# Patient Record
Sex: FEMALE | Race: OTHER | NOT HISPANIC OR LATINO | URBAN - METROPOLITAN AREA
[De-identification: names, ages, dates, MRNs, and addresses within clinical notes are randomized per-mention and may not be internally consistent; named-entity substitution may affect disease eponyms.]

---

## 2023-04-15 ENCOUNTER — EMERGENCY (EMERGENCY)
Facility: HOSPITAL | Age: 63
LOS: 0 days | Discharge: HOME | End: 2023-04-15
Attending: STUDENT IN AN ORGANIZED HEALTH CARE EDUCATION/TRAINING PROGRAM
Payer: COMMERCIAL

## 2023-04-15 VITALS
DIASTOLIC BLOOD PRESSURE: 95 MMHG | WEIGHT: 227.08 LBS | OXYGEN SATURATION: 98 % | HEIGHT: 64 IN | TEMPERATURE: 98 F | HEART RATE: 100 BPM | SYSTOLIC BLOOD PRESSURE: 157 MMHG | RESPIRATION RATE: 18 BRPM

## 2023-04-15 VITALS
HEART RATE: 85 BPM | DIASTOLIC BLOOD PRESSURE: 93 MMHG | RESPIRATION RATE: 16 BRPM | TEMPERATURE: 98 F | OXYGEN SATURATION: 98 % | SYSTOLIC BLOOD PRESSURE: 198 MMHG

## 2023-04-15 DIAGNOSIS — N64.52 NIPPLE DISCHARGE: ICD-10-CM

## 2023-04-15 PROCEDURE — 99284 EMERGENCY DEPT VISIT MOD MDM: CPT

## 2023-04-15 PROCEDURE — 71250 CT THORAX DX C-: CPT | Mod: 26,MA

## 2023-04-15 NOTE — ED PROVIDER NOTE - OBJECTIVE STATEMENT
62 year old female presents today c/o small amount of dark blood noted from her right nipple yesterday, pt states that she did strike her breast against a door at work on Friday, thought nothing of it, denies having any pain and open wound, yesterday a small drop of blood was seen, pts last breast sono and mammo was 2 years ago, at that time her results were normal

## 2023-04-15 NOTE — ED PROVIDER NOTE - PATIENT PORTAL LINK FT
You can access the FollowMyHealth Patient Portal offered by [facility] by registering at the following website: [email or web address]. By joining [employer]’s FollowMyHealth portal, you will also be able to view your health information using other applications (apps) compatible with our system.

## 2023-04-15 NOTE — ED ADULT NURSE REASSESSMENT NOTE - NS ED NURSE REASSESS COMMENT FT1
Pt AOx4 and acepts the d/c from the MD. Pt reports no acute distress at this time. Pt states shes going to take her BP meds at home.
Pt recived from Day RN Erika. Pt rpeorts no acute distress at this time. Will continue to monitor.

## 2023-04-15 NOTE — ED PROVIDER NOTE - NSFOLLOWUPINSTRUCTIONS_ED_ALL_ED_FT
I have discussed the discharge plan with the patient. The patient agrees with the plan, as discussed.  The patient understands Emergency Department diagnosis is a preliminary diagnosis often based on limited information and that the patient must adhere to the follow-up plan as discussed.  The patient understands that if the symptoms worsen or if prescribed medications do not have the desired/planned effect that the patient may return to the Emergency Department at any time for further evaluation and treatment. Pt to follow up with her gynecologist for outpt breast sono and mammo

## 2023-04-15 NOTE — ED PROVIDER NOTE - CARE PLAN
1 Principal Discharge DX:	Nipple discharge   Principal Discharge DX:	Nipple discharge  Secondary Diagnosis:	Breast pain

## 2023-04-15 NOTE — ED PROVIDER NOTE - CLINICAL SUMMARY MEDICAL DECISION MAKING FREE TEXT BOX
pt presented today concerned for bloody discharge from her right nipple, pt states that her struck her breast against a door at work, on exam breast exam is normal, pts skin is without bruising, redness or swelling , no blooding discharge noted, follow up with her gyn in one week

## 2023-04-15 NOTE — ED ADULT NURSE NOTE - OBJECTIVE STATEMENT
as per pt, "I hit my breast at work and water was coming out of my nipple and my BP was high I also have pain to the top part of my L shoulder my cardiologist changed my BP medication today and it is bringing down my pressure" pt in no acute distress, no trauma noted to breast area , no drainage noted

## 2023-04-15 NOTE — ED ADULT TRIAGE NOTE - CHIEF COMPLAINT QUOTE
Patient arrived to ED with complaints of pain and discharge to R breast for the past couple post injury to site about a week ago.

## 2023-07-26 ENCOUNTER — OUTPATIENT (OUTPATIENT)
Dept: OUTPATIENT SERVICES | Facility: HOSPITAL | Age: 63
LOS: 1 days | End: 2023-07-26
Payer: COMMERCIAL

## 2023-07-26 ENCOUNTER — OUTPATIENT (OUTPATIENT)
Dept: OUTPATIENT SERVICES | Facility: HOSPITAL | Age: 63
LOS: 1 days | End: 2023-07-26

## 2023-07-26 VITALS
RESPIRATION RATE: 16 BRPM | DIASTOLIC BLOOD PRESSURE: 98 MMHG | OXYGEN SATURATION: 98 % | TEMPERATURE: 98 F | SYSTOLIC BLOOD PRESSURE: 160 MMHG | HEIGHT: 64 IN | WEIGHT: 246.04 LBS | HEART RATE: 84 BPM

## 2023-07-26 DIAGNOSIS — C50.911 MALIGNANT NEOPLASM OF UNSPECIFIED SITE OF RIGHT FEMALE BREAST: ICD-10-CM

## 2023-07-26 DIAGNOSIS — E78.5 HYPERLIPIDEMIA, UNSPECIFIED: ICD-10-CM

## 2023-07-26 DIAGNOSIS — I10 ESSENTIAL (PRIMARY) HYPERTENSION: ICD-10-CM

## 2023-07-26 DIAGNOSIS — Z98.890 OTHER SPECIFIED POSTPROCEDURAL STATES: Chronic | ICD-10-CM

## 2023-07-26 DIAGNOSIS — Z98.891 HISTORY OF UTERINE SCAR FROM PREVIOUS SURGERY: Chronic | ICD-10-CM

## 2023-07-26 DIAGNOSIS — E11.9 TYPE 2 DIABETES MELLITUS WITHOUT COMPLICATIONS: ICD-10-CM

## 2023-07-26 DIAGNOSIS — Z01.818 ENCOUNTER FOR OTHER PREPROCEDURAL EXAMINATION: ICD-10-CM

## 2023-07-26 LAB
A1C WITH ESTIMATED AVERAGE GLUCOSE RESULT: 6.9 % — HIGH (ref 4–5.6)
ANION GAP SERPL CALC-SCNC: 8 MMOL/L — SIGNIFICANT CHANGE UP (ref 5–17)
APPEARANCE UR: CLEAR — SIGNIFICANT CHANGE UP
APTT BLD: 28.7 SEC — SIGNIFICANT CHANGE UP (ref 24.5–35.6)
BACTERIA # UR AUTO: ABNORMAL /HPF
BILIRUB UR-MCNC: NEGATIVE — SIGNIFICANT CHANGE UP
BUN SERPL-MCNC: 12 MG/DL — SIGNIFICANT CHANGE UP (ref 7–18)
CALCIUM SERPL-MCNC: 8.9 MG/DL — SIGNIFICANT CHANGE UP (ref 8.4–10.5)
CHLORIDE SERPL-SCNC: 108 MMOL/L — SIGNIFICANT CHANGE UP (ref 96–108)
CO2 SERPL-SCNC: 27 MMOL/L — SIGNIFICANT CHANGE UP (ref 22–31)
COLOR SPEC: YELLOW — SIGNIFICANT CHANGE UP
CREAT SERPL-MCNC: 0.63 MG/DL — SIGNIFICANT CHANGE UP (ref 0.5–1.3)
DIFF PNL FLD: ABNORMAL
EGFR: 100 ML/MIN/1.73M2 — SIGNIFICANT CHANGE UP
EPI CELLS # UR: ABNORMAL /HPF
ESTIMATED AVERAGE GLUCOSE: 151 MG/DL — HIGH (ref 68–114)
GLUCOSE SERPL-MCNC: 125 MG/DL — HIGH (ref 70–99)
GLUCOSE UR QL: NEGATIVE — SIGNIFICANT CHANGE UP
HCT VFR BLD CALC: 47.2 % — HIGH (ref 34.5–45)
HGB BLD-MCNC: 15.2 G/DL — SIGNIFICANT CHANGE UP (ref 11.5–15.5)
INR BLD: 0.93 RATIO — SIGNIFICANT CHANGE UP (ref 0.85–1.18)
KETONES UR-MCNC: NEGATIVE — SIGNIFICANT CHANGE UP
LEUKOCYTE ESTERASE UR-ACNC: NEGATIVE — SIGNIFICANT CHANGE UP
MCHC RBC-ENTMCNC: 30 PG — SIGNIFICANT CHANGE UP (ref 27–34)
MCHC RBC-ENTMCNC: 32.2 GM/DL — SIGNIFICANT CHANGE UP (ref 32–36)
MCV RBC AUTO: 93.3 FL — SIGNIFICANT CHANGE UP (ref 80–100)
NITRITE UR-MCNC: NEGATIVE — SIGNIFICANT CHANGE UP
NRBC # BLD: 0 /100 WBCS — SIGNIFICANT CHANGE UP (ref 0–0)
PH UR: 5 — SIGNIFICANT CHANGE UP (ref 5–8)
PLATELET # BLD AUTO: 189 K/UL — SIGNIFICANT CHANGE UP (ref 150–400)
POTASSIUM SERPL-MCNC: 3.7 MMOL/L — SIGNIFICANT CHANGE UP (ref 3.5–5.3)
POTASSIUM SERPL-SCNC: 3.7 MMOL/L — SIGNIFICANT CHANGE UP (ref 3.5–5.3)
PROT UR-MCNC: 15 MG/DL
PROTHROM AB SERPL-ACNC: 10.6 SEC — SIGNIFICANT CHANGE UP (ref 9.5–13)
RBC # BLD: 5.06 M/UL — SIGNIFICANT CHANGE UP (ref 3.8–5.2)
RBC # FLD: 15.6 % — HIGH (ref 10.3–14.5)
RBC CASTS # UR COMP ASSIST: SIGNIFICANT CHANGE UP /HPF (ref 0–2)
SODIUM SERPL-SCNC: 143 MMOL/L — SIGNIFICANT CHANGE UP (ref 135–145)
SP GR SPEC: 1.02 — SIGNIFICANT CHANGE UP (ref 1.01–1.02)
UROBILINOGEN FLD QL: NEGATIVE — SIGNIFICANT CHANGE UP
WBC # BLD: 6.86 K/UL — SIGNIFICANT CHANGE UP (ref 3.8–10.5)
WBC # FLD AUTO: 6.86 K/UL — SIGNIFICANT CHANGE UP (ref 3.8–10.5)
WBC UR QL: SIGNIFICANT CHANGE UP /HPF (ref 0–5)

## 2023-07-26 NOTE — H&P PST ADULT - NSANTHOSAYNRD_GEN_A_CORE
No. LETA screening performed.  STOP BANG Legend: 0-2 = LOW Risk; 3-4 = INTERMEDIATE Risk; 5-8 = HIGH Risk

## 2023-07-26 NOTE — H&P PST ADULT - PROBLEM SELECTOR PLAN 1
Patient is scheduled for right breast lumpectomy with preoperative needle localization and right breast sentinel lymph node biopsy on 8/2/2023.  Written and oral preoperative instructions given to patient with understanding verbalized. Instructions given  to include using 4% chlorhexidine wash and directed starting 3 days before day of surgery ( inclusive day of surgery). Maintaining NPO status post midnight before surgery. Stopping aspirin, NSAIDs, herbs, vitamins 7 days before surgery. Patient is to expect a phone call day before surgery between the hours 4 : 30 pm and 6: 30 pm giving arrival time for surgery day.  [facility] pending labs and chest xray , labs done at [facility] to be faxed to PCP for [facility]

## 2023-07-26 NOTE — H&P PST ADULT - HISTORY OF PRESENT ILLNESS
er , discharge 4/23 , no findings, pCP mammo , sono , mass breast mass bx and ca 62 years old female pmh of diabetes mellitus, fibroids, hypertension , high cholesterol and psh of myomectomy and 2 c- sections presented to PST for evaluation before surgery, pt was seen at ER for brownish discharge from breast , had mammogram , bx and was dx with malignant neoplasm of unspecified site of right female breast . Patient is scheduled for right breast lumpectomy with preoperative needle localization and right breast sentinel lymph node biopsy on 8/2/2023.

## 2023-07-26 NOTE — H&P PST ADULT - NSICDXPASTMEDICALHX_GEN_ALL_CORE_FT
PAST MEDICAL HISTORY:  DM (diabetes mellitus)     HLD (hyperlipidemia)     HTN (hypertension)      PAST MEDICAL HISTORY:  DM (diabetes mellitus)     Fibroids     HLD (hyperlipidemia)     HTN (hypertension)

## 2023-07-26 NOTE — H&P PST ADULT - NSICDXPROCEDURE_GEN_ALL_CORE_FT
PROCEDURES:  Lumpectomy, breast, after needle localization, with sentinel lymph node dissection 26-Jul-2023 22:25:59 right Renae Sherwood

## 2023-07-26 NOTE — H&P PST ADULT - NSICDXFAMILYHX_GEN_ALL_CORE_FT
FAMILY HISTORY:  Father  Still living? No  FH: type 2 diabetes, Age at diagnosis: Age Unknown    Mother  Still living? No  FH: hypertension, Age at diagnosis: Age Unknown  FH: lung cancer, Age at diagnosis: Age Unknown    Sibling  Still living? No  FH: ovarian cancer, Age at diagnosis: Age Unknown

## 2023-07-26 NOTE — H&P PST ADULT - NEGATIVE FEMALE-SPECIFIC SYMPTOMS
no irregular menses/no vaginal discharge/no dysmenorrhea/no amenorrhea/no menorrhagia/no spotting/no pelvic pain

## 2023-07-26 NOTE — H&P PST ADULT - ASSESSMENT
Patient is scheduled for right breast lumpectomy with preoperative needle localization and right breast sentinel lymph node biopsy on 8/2/2023. SYOP BANG 4, Intermediate Risk for LETA, BMI 42.2, Mallampati II

## 2023-07-26 NOTE — H&P PST ADULT - PROBLEM SELECTOR PLAN 4
Instructed to continue Metformin and to hold on day of surgery. Perioperative glucose monitoring and coverage as needed. Patient will have blood sugar checked in am of sx. Follow-up with PCP for diabetic management

## 2023-07-26 NOTE — H&P PST ADULT - PROBLEM SELECTOR PLAN 2
Instructed to continue antihypertensive meds Metoprolol succinate 50 mg and losartan 50m mg and take with sips of water on day of surgery.  Follow-up with PCP postop for management.

## 2023-07-27 PROCEDURE — G0463: CPT

## 2023-08-02 ENCOUNTER — OUTPATIENT (OUTPATIENT)
Dept: OUTPATIENT SERVICES | Facility: HOSPITAL | Age: 63
LOS: 1 days | End: 2023-08-02
Payer: COMMERCIAL

## 2023-08-02 VITALS
TEMPERATURE: 98 F | DIASTOLIC BLOOD PRESSURE: 77 MMHG | RESPIRATION RATE: 16 BRPM | SYSTOLIC BLOOD PRESSURE: 149 MMHG | OXYGEN SATURATION: 100 % | HEART RATE: 66 BPM

## 2023-08-02 VITALS
HEIGHT: 64 IN | SYSTOLIC BLOOD PRESSURE: 151 MMHG | WEIGHT: 246.04 LBS | TEMPERATURE: 99 F | RESPIRATION RATE: 16 BRPM | HEART RATE: 78 BPM | DIASTOLIC BLOOD PRESSURE: 78 MMHG | OXYGEN SATURATION: 99 %

## 2023-08-02 DIAGNOSIS — Z98.890 OTHER SPECIFIED POSTPROCEDURAL STATES: Chronic | ICD-10-CM

## 2023-08-02 DIAGNOSIS — C50.911 MALIGNANT NEOPLASM OF UNSPECIFIED SITE OF RIGHT FEMALE BREAST: ICD-10-CM

## 2023-08-02 DIAGNOSIS — Z98.891 HISTORY OF UTERINE SCAR FROM PREVIOUS SURGERY: Chronic | ICD-10-CM

## 2023-08-02 LAB
GLUCOSE BLDC GLUCOMTR-MCNC: 132 MG/DL — HIGH (ref 70–99)
GLUCOSE BLDC GLUCOMTR-MCNC: 137 MG/DL — HIGH (ref 70–99)
GLUCOSE BLDC GLUCOMTR-MCNC: 141 MG/DL — HIGH (ref 70–99)

## 2023-08-02 PROCEDURE — 88333 PATH CONSLTJ SURG CYTO XM 1: CPT | Mod: 26

## 2023-08-02 PROCEDURE — 88307 TISSUE EXAM BY PATHOLOGIST: CPT | Mod: 26

## 2023-08-02 PROCEDURE — 88333 PATH CONSLTJ SURG CYTO XM 1: CPT

## 2023-08-02 PROCEDURE — 76098 X-RAY EXAM SURGICAL SPECIMEN: CPT | Mod: 26

## 2023-08-02 PROCEDURE — 19281 PERQ DEVICE BREAST 1ST IMAG: CPT | Mod: RT

## 2023-08-02 PROCEDURE — 38525 BIOPSY/REMOVAL LYMPH NODES: CPT | Mod: RT

## 2023-08-02 PROCEDURE — 82962 GLUCOSE BLOOD TEST: CPT

## 2023-08-02 PROCEDURE — 76098 X-RAY EXAM SURGICAL SPECIMEN: CPT

## 2023-08-02 PROCEDURE — 88307 TISSUE EXAM BY PATHOLOGIST: CPT

## 2023-08-02 PROCEDURE — 88342 IMHCHEM/IMCYTCHM 1ST ANTB: CPT | Mod: 26

## 2023-08-02 PROCEDURE — 88342 IMHCHEM/IMCYTCHM 1ST ANTB: CPT

## 2023-08-02 PROCEDURE — 19125 EXCISION BREAST LESION: CPT | Mod: RT

## 2023-08-02 PROCEDURE — 88341 IMHCHEM/IMCYTCHM EA ADD ANTB: CPT | Mod: 26

## 2023-08-02 PROCEDURE — 19281 PERQ DEVICE BREAST 1ST IMAG: CPT

## 2023-08-02 PROCEDURE — 38792 RA TRACER ID OF SENTINL NODE: CPT | Mod: RT

## 2023-08-02 PROCEDURE — 88341 IMHCHEM/IMCYTCHM EA ADD ANTB: CPT

## 2023-08-02 RX ORDER — METFORMIN HYDROCHLORIDE 850 MG/1
1 TABLET ORAL
Refills: 0 | DISCHARGE

## 2023-08-02 RX ORDER — MILK THISTLE 150 MG
1 CAPSULE ORAL
Refills: 0 | DISCHARGE

## 2023-08-02 RX ORDER — SODIUM CHLORIDE 9 MG/ML
3 INJECTION INTRAMUSCULAR; INTRAVENOUS; SUBCUTANEOUS EVERY 8 HOURS
Refills: 0 | Status: DISCONTINUED | OUTPATIENT
Start: 2023-08-02 | End: 2023-08-02

## 2023-08-02 RX ORDER — LOSARTAN POTASSIUM 100 MG/1
1 TABLET, FILM COATED ORAL
Refills: 0 | DISCHARGE

## 2023-08-02 RX ORDER — METOPROLOL TARTRATE 50 MG
1 TABLET ORAL
Refills: 0 | DISCHARGE

## 2023-08-02 RX ORDER — OXYCODONE AND ACETAMINOPHEN 5; 325 MG/1; MG/1
1 TABLET ORAL
Qty: 12 | Refills: 0
Start: 2023-08-02 | End: 2023-08-04

## 2023-08-02 RX ORDER — OXYCODONE AND ACETAMINOPHEN 5; 325 MG/1; MG/1
1 TABLET ORAL ONCE
Refills: 0 | Status: DISCONTINUED | OUTPATIENT
Start: 2023-08-02 | End: 2023-08-02

## 2023-08-02 NOTE — ASU PATIENT PROFILE, ADULT - REASON FOR ADMISSION, PROFILE
right breast lumpectomy with preoperative needle localization and right breast sentinel lymph node biopsy on 8/2/2023.

## 2023-08-02 NOTE — ASU DISCHARGE PLAN (ADULT/PEDIATRIC) - CARE PROVIDER_API CALL
Grady Fernando  [place] [address]  Phone: [phone]  Fax: [phone]  Established Patient  Follow Up Time: 2 weeks

## 2023-08-02 NOTE — ASU PATIENT PROFILE, ADULT - NS PRO MODE OF ARRIVAL
PROCEDURE:  MR Angiography of the neck without contrast



HISTORY:

Seizures 



COMPARISON:

None available. 



TECHNIQUE:

3D Time-of-flight angiography of the neck was performed. Rotating 

maximum intensity projection images of the cervical carotid and 

vertebral arteries were generated. The origins of the common carotid 

arteries were not visualized, which is a limitation inherent to the 

non-contrast time of flight technique.



FINDINGS:



RIGHT CAROTID ARTERIES:

Common Carotid Artery: Normal.



Carotid Bifurcation: Normal.



Internal Carotid Artery:Normal.



External Carotid Artery (proximal branches): Normal.



LEFT CAROTID ARTERIES:

Common Carotid Artery: Normal.



Carotid Bifurcation: Normal.



Internal Carotid Artery:Normal.



External Carotid Artery (proximal branches): Normal.



VERTEBRAL ARTERIES:

Right Vertebral Artery: Normal.



Left Vertebral Artery: Normal.



OTHER FINDINGS:

None.



IMPRESSION:

Normal MR Angiography of the neck. escort JAMIA daughter [phone]/ambulatory

## 2023-08-02 NOTE — ASU DISCHARGE PLAN (ADULT/PEDIATRIC) - ASU DC SPECIAL INSTRUCTIONSFT
Please keep dressing dry and intact for at least 24 hours, then can remove overlying dressing and shower area normally, patting area dry. Keep underlying adhesive strips in place, they will fall off on their own. Take ibuprofen as needed for pain. Take the prescribed Percocet for pain not controlled by ibuprofen. Do not take tylenol while taking Percocet. Please call for a 2 week follow up appointment with your surgeon.

## 2023-08-02 NOTE — ASU PATIENT PROFILE, ADULT - NSICDXPASTMEDICALHX_GEN_ALL_CORE_FT
PAST MEDICAL HISTORY:  DM (diabetes mellitus)     Fibroids     HLD (hyperlipidemia)     HTN (hypertension)

## 2023-08-02 NOTE — ASU DISCHARGE PLAN (ADULT/PEDIATRIC) - NS MD DC FALL RISK RISK
For information on Fall & Injury Prevention, visit: [email or web address] OR  [email or web address] OR  https://www.cdc.gov/steadi/patient.html

## 2023-08-04 LAB — SURGICAL PATHOLOGY STUDY: SIGNIFICANT CHANGE UP

## 2024-01-22 PROBLEM — E11.9 TYPE 2 DIABETES MELLITUS WITHOUT COMPLICATIONS: Chronic | Status: ACTIVE | Noted: 2023-07-26

## 2024-01-22 PROBLEM — I10 ESSENTIAL (PRIMARY) HYPERTENSION: Chronic | Status: ACTIVE | Noted: 2023-07-26

## 2024-01-22 PROBLEM — E78.5 HYPERLIPIDEMIA, UNSPECIFIED: Chronic | Status: ACTIVE | Noted: 2023-07-26

## (undated) DEVICE — SUT VICRYL PLUS 2-0 27" SH

## (undated) DEVICE — DRSG MASTISOL

## (undated) DEVICE — DRSG TEGADERM 4X4.75"

## (undated) DEVICE — PREP BETADINE SPONGE STICKS

## (undated) DEVICE — FOR-ESU VALLEYLAB T7E15009DX: Type: DURABLE MEDICAL EQUIPMENT

## (undated) DEVICE — DRAPE LAPAROTOMY TRANSVERSE

## (undated) DEVICE — SYR LUER LOK 10CC

## (undated) DEVICE — SUT SOFSILK 2-0 30" V-20

## (undated) DEVICE — DRAPE LIGHT HANDLE COVER (BLUE)

## (undated) DEVICE — NDL HYPO REGULAR BEVEL 25G X 1.5" (BLUE)

## (undated) DEVICE — SUT VICRYL PLUS 4-0 27" PS-2 UNDYED

## (undated) DEVICE — DRSG CURITY GAUZE SPONGE 4 X 4" 12-PLY

## (undated) DEVICE — ELCTR GROUNDING PAD ADULT COVIDIEN

## (undated) DEVICE — SUT SILK 2-0 18" FS

## (undated) DEVICE — PACK MINOR NO DRAPE

## (undated) DEVICE — SUT POLYSORB 4-0 27" P-12 UNDYED

## (undated) DEVICE — SOL IRR POUR H2O 1500ML

## (undated) DEVICE — WARMING BLANKET LOWER ADULT

## (undated) DEVICE — SUT POLYSORB 3-0 30" V-20 UNDYED

## (undated) DEVICE — DRAIN JACKSON PRATT 10MM FLAT 3/4 NO TROCAR

## (undated) DEVICE — SUT VICRYL 3-0 27" SH UNDYED

## (undated) DEVICE — VENODYNE/SCD SLEEVE CALF MEDIUM

## (undated) DEVICE — DRSG STERISTRIPS 0.5 X 4"

## (undated) DEVICE — DRAIN RESERVOIR FOR JACKSON PRATT 100CC CARDINAL